# Patient Record
Sex: MALE | Race: WHITE | NOT HISPANIC OR LATINO | Employment: FULL TIME | URBAN - METROPOLITAN AREA
[De-identification: names, ages, dates, MRNs, and addresses within clinical notes are randomized per-mention and may not be internally consistent; named-entity substitution may affect disease eponyms.]

---

## 2017-05-16 ENCOUNTER — ALLSCRIPTS OFFICE VISIT (OUTPATIENT)
Dept: OTHER | Facility: OTHER | Age: 37
End: 2017-05-16

## 2017-08-16 DIAGNOSIS — R00.2 PALPITATIONS: ICD-10-CM

## 2017-08-16 DIAGNOSIS — T50.905A ADVERSE EFFECT OF DRUG OR MEDICAMENT: ICD-10-CM

## 2017-08-16 DIAGNOSIS — I10 ESSENTIAL (PRIMARY) HYPERTENSION: ICD-10-CM

## 2017-08-16 DIAGNOSIS — I49.3 VENTRICULAR PREMATURE DEPOLARIZATION: ICD-10-CM

## 2018-01-14 VITALS
SYSTOLIC BLOOD PRESSURE: 110 MMHG | DIASTOLIC BLOOD PRESSURE: 80 MMHG | WEIGHT: 207 LBS | BODY MASS INDEX: 30.66 KG/M2 | HEIGHT: 69 IN | HEART RATE: 83 BPM

## 2018-05-18 RX ORDER — ATOMOXETINE 40 MG/1
1 CAPSULE ORAL DAILY
COMMUNITY

## 2018-05-18 RX ORDER — DIVALPROEX SODIUM 500 MG/1
750 TABLET, DELAYED RELEASE ORAL
COMMUNITY

## 2018-05-18 RX ORDER — METOPROLOL SUCCINATE 50 MG/1
1 TABLET, EXTENDED RELEASE ORAL
COMMUNITY
Start: 2017-10-10

## 2018-05-18 RX ORDER — LITHIUM CARBONATE 300 MG/1
1 TABLET, FILM COATED, EXTENDED RELEASE ORAL 2 TIMES DAILY
COMMUNITY

## 2018-05-18 RX ORDER — METHYLPREDNISOLONE 4 MG/1
TABLET ORAL
Refills: 0 | COMMUNITY
Start: 2018-03-28 | End: 2018-05-24 | Stop reason: CLARIF

## 2018-05-18 RX ORDER — BUSPIRONE HYDROCHLORIDE 15 MG/1
15 TABLET ORAL 2 TIMES DAILY
Refills: 5 | COMMUNITY
Start: 2018-03-22

## 2018-05-24 ENCOUNTER — OFFICE VISIT (OUTPATIENT)
Dept: CARDIOLOGY CLINIC | Facility: CLINIC | Age: 38
End: 2018-05-24
Payer: COMMERCIAL

## 2018-05-24 VITALS
DIASTOLIC BLOOD PRESSURE: 70 MMHG | WEIGHT: 209.3 LBS | BODY MASS INDEX: 31 KG/M2 | SYSTOLIC BLOOD PRESSURE: 98 MMHG | HEIGHT: 69 IN | HEART RATE: 96 BPM

## 2018-05-24 DIAGNOSIS — I10 BENIGN ESSENTIAL HYPERTENSION: Primary | ICD-10-CM

## 2018-05-24 DIAGNOSIS — R00.0 SINUS TACHYCARDIA: ICD-10-CM

## 2018-05-24 DIAGNOSIS — R00.2 PALPITATIONS: ICD-10-CM

## 2018-05-24 PROCEDURE — 99213 OFFICE O/P EST LOW 20 MIN: CPT | Performed by: INTERNAL MEDICINE

## 2018-05-24 NOTE — PROGRESS NOTES
Follow-up - Cardiology   Tila Foreman 42 y o  male MRN: [id number]        Problems    1  Benign essential hypertension  Basic metabolic panel   2  Palpitations     3  Sinus tachycardia         Impression:    Amandeep Reasons up with me at the [place] office for his prior history of sinus tachycardia, palpitations, benign essential hypertension  Blood pressure a little low today, but routinely well controlled  Palpitations only occur when he gets anxious  This did recently occur when he was on doxycycline, and was promptly stopped  No history of any significant arrhythmias discovered  Plan:      I recommend weekly blood pressure checks  If lightheadedness or low blood pressure persists, please cut losartan in half  HPI:   Tila Foreman is a 42y o  year old male   With a history of palpitations, hypertension, sinus tachycardia, mood disorder and prior history of adverse drug reaction to clarithromycin with a psychotic presentation who continues to follow with me for his history of palpitations and hypertension  His blood pressure is a little low today, he does feel slightly woozy, this is very unusual and not a typical symptom  His blood pressures routinely well controlled, he takes metoprolol succinate 50 mg daily and losartan 50 mg daily  Unfortunately he has not had blood work done, a BMP was ordered a year ago  He offers me no other new complaints  Review of Systems      Past Medical History:   Diagnosis Date    Anxiety     Bipolar disorder ([facility] [address] )     Depression     Hyperlipidemia     Hypertension     Psychiatric illness      History   Alcohol Use No     History   Drug Use No     History   Smoking Status    Never Smoker   Smokeless Tobacco    Never Used       Allergies:   Allergies   Allergen Reactions    Aloe     Ativan [Lorazepam]     Biaxin [Clarithromycin]     Ceclor [Cefaclor]     Clindamycin Nausea Only    Dexamethasone     Erythromycin Base     Escitalopram  Hepatitis B Virus Vaccine Hives    Iodine     Levaquin [Levofloxacin In D5w]     Levofloxacin     Lexapro [Escitalopram Oxalate]     Penicillins     Quinolones     Risperdal [Risperidone]     Sulfamethoxazole-Trimethoprim Other (See Comments)    Vancomycin     Xanax [Alprazolam]        Medications:     Current Outpatient Prescriptions:     atoMOXetine (STRATTERA) 40 mg capsule, Take 1 tablet by mouth daily, Disp: , Rfl:     busPIRone (BUSPAR) 15 mg tablet, 15 mg 3 (three) times a day, Disp: , Rfl: 5    divalproex sodium (DEPAKOTE) 500 mg EC tablet, Take 750 mg by mouth Daily  , Disp: , Rfl:     lithium carbonate (LITHOBID) 300 mg CR tablet, Take 1 tablet by mouth 2 (two) times a day, Disp: , Rfl:     losartan (COZAAR) 50 mg tablet, Take 50 mg by mouth daily  , Disp: , Rfl:     metoprolol succinate (TOPROL-XL) 50 mg 24 hr tablet, Take 1 tablet by mouth daily, Disp: , Rfl:     Multiple Vitamin (DAILY MULTIVITAMIN PO), Daily Multivitamin TABS  Refills: 0  Active, Disp: , Rfl:     Omega-3 Fatty Acids (FISH OIL PO), Take 3,000 g by mouth daily  , Disp: , Rfl:       Vitals:    05/24/18 1303   BP: 98/70   Pulse: 96     Weight (last 2 days)     Date/Time   Weight    05/24/18 1303  94 9 (209 3)            Physical Exam   Constitutional: He is oriented to person, place, and time  No distress  HENT:   Head: Normocephalic and atraumatic  Eyes: Conjunctivae are normal  No scleral icterus  Neck: Normal range of motion  No JVD present  Cardiovascular: Normal rate, regular rhythm, normal heart sounds and intact distal pulses  No murmur heard  Pulmonary/Chest: Effort normal and breath sounds normal  He has no wheezes  He has no rales  Musculoskeletal: He exhibits no edema  Neurological: He is alert and oriented to person, place, and time  Skin: Skin is warm and dry  He is not diaphoretic           Laboratory Studies:  Lab Results   Component Value Date     01/30/2016     01/25/2016  01/20/2016    K 3 5 01/30/2016    K 3 8 01/25/2016    K 3 8 01/20/2016     01/30/2016     01/25/2016     01/20/2016    CO2 31 01/30/2016    CO2 33 (H) 01/25/2016    CO2 32 01/20/2016    GLUCOSE 84 01/30/2016    GLUCOSE 91 01/25/2016    GLUCOSE 93 01/20/2016    CREATININE 0 91 01/30/2016    CREATININE 1 18 01/25/2016    CREATININE 1 19 01/20/2016    BUN 8 01/30/2016    BUN 13 01/25/2016    BUN 11 01/20/2016     Lab Results   Component Value Date    WBC 5 72 01/30/2016    RBC 4 79 01/30/2016    HGB 13 6 01/30/2016    HCT 41 4 01/30/2016    MCV 86 4 01/30/2016    MCH 28 4 01/30/2016    RDW 12 3 01/30/2016     01/30/2016     Lipid Profile:   Lab Results   Component Value Date    CHOL 147 01/28/2016    CHOL 155 01/20/2016     Lab Results   Component Value Date    HDL 31 (L) 01/28/2016    HDL 35 (L) 01/20/2016     Lab Results   Component Value Date    LDLCALC 91 01/28/2016    LDLCALC 98 01/20/2016     Lab Results   Component Value Date    TRIG 125 01/28/2016    TRIG 108 01/20/2016         Cardiac testing:         Trude Fothergill, MD    Portions of the record may have been created with voice recognition software   Occasional wrong word or "sound a like" substitutions may have occurred due to the inherent limitations of voice recognition software   Read the chart carefully and recognize, using context, where substitutions have occurred

## 2019-06-12 ENCOUNTER — OFFICE VISIT (OUTPATIENT)
Dept: CARDIOLOGY CLINIC | Facility: CLINIC | Age: 39
End: 2019-06-12
Payer: COMMERCIAL

## 2019-06-12 VITALS
BODY MASS INDEX: 29.49 KG/M2 | WEIGHT: 199.1 LBS | HEART RATE: 104 BPM | DIASTOLIC BLOOD PRESSURE: 82 MMHG | SYSTOLIC BLOOD PRESSURE: 122 MMHG | HEIGHT: 69 IN

## 2019-06-12 DIAGNOSIS — T50.905D ADVERSE EFFECT OF DRUG, SUBSEQUENT ENCOUNTER: ICD-10-CM

## 2019-06-12 DIAGNOSIS — R07.89 ATYPICAL CHEST PAIN: ICD-10-CM

## 2019-06-12 DIAGNOSIS — F39 MOOD DISORDER (HCC): Chronic | ICD-10-CM

## 2019-06-12 DIAGNOSIS — R00.0 SINUS TACHYCARDIA: ICD-10-CM

## 2019-06-12 DIAGNOSIS — I10 HYPERTENSION, UNSPECIFIED TYPE: Primary | ICD-10-CM

## 2019-06-12 DIAGNOSIS — R00.2 PALPITATIONS: ICD-10-CM

## 2019-06-12 DIAGNOSIS — I10 BENIGN ESSENTIAL HYPERTENSION: ICD-10-CM

## 2019-06-12 PROCEDURE — 93000 ELECTROCARDIOGRAM COMPLETE: CPT | Performed by: INTERNAL MEDICINE

## 2019-06-12 PROCEDURE — 99213 OFFICE O/P EST LOW 20 MIN: CPT | Performed by: INTERNAL MEDICINE

## 2019-06-12 RX ORDER — OMEPRAZOLE 20 MG/1
20 CAPSULE, DELAYED RELEASE ORAL
COMMUNITY
End: 2022-06-22

## 2022-06-09 ENCOUNTER — CONSULT (OUTPATIENT)
Dept: SURGERY | Facility: CLINIC | Age: 42
End: 2022-06-09
Payer: COMMERCIAL

## 2022-06-09 ENCOUNTER — PREP FOR PROCEDURE (OUTPATIENT)
Dept: SURGERY | Facility: CLINIC | Age: 42
End: 2022-06-09

## 2022-06-09 VITALS
WEIGHT: 204 LBS | HEIGHT: 69 IN | DIASTOLIC BLOOD PRESSURE: 89 MMHG | HEART RATE: 78 BPM | BODY MASS INDEX: 30.21 KG/M2 | SYSTOLIC BLOOD PRESSURE: 130 MMHG

## 2022-06-09 DIAGNOSIS — K42.9 UMBILICAL HERNIA: Primary | ICD-10-CM

## 2022-06-09 DIAGNOSIS — K42.9 UMBILICAL HERNIA WITHOUT OBSTRUCTION AND WITHOUT GANGRENE: Primary | ICD-10-CM

## 2022-06-09 PROCEDURE — 99244 OFF/OP CNSLTJ NEW/EST MOD 40: CPT | Performed by: SURGERY

## 2022-06-09 NOTE — H&P (VIEW-ONLY)
Assessment/Plan:    Diagnoses and all orders for this visit:    Umbilical hernia without obstruction and without gangrene    Discussed risks and benefits of an umbilical hernia repair including recurrence or bowel injury and agrees to proceed  States anaphylaxis to Ceclor  Clindamycin gives him nausea only  Subjective:      Patient ID: Gemini Mukherjee is a 35 y o  male  Patient presents for umbilical hernia consult  States he has had a bulge and achy pain at his umbilicus for 2 months  Limits his activities  He is feeling occasional discomfort as he is noticed the bulge progress  Denies nausea or vomiting  I reviewed his multiple allergies listed  He asked about anesthesia at the time of his umbilical hernia  He did have fever for several weeks after a knee arthroscopy  Had seen endocrinology and no specific etiology was able to be found  States he has seen an allergist regarding his multiple medication allergies  The following portions of the patient's history were reviewed and updated as appropriate:     He  has a past medical history of Anxiety, Bipolar disorder ([address] ), Depression, Hyperlipidemia, Hypertension, and Psychiatric illness  He  has a past surgical history that includes Knee arthroscopy w/ ACL reconstruction  His family history includes Bipolar disorder in his father and sister; Depression in his mother  He  reports that he has never smoked  He has never used smokeless tobacco  He reports that he does not drink alcohol and does not use drugs    Current Outpatient Medications   Medication Sig Dispense Refill    atoMOXetine (STRATTERA) 40 mg capsule Take 1 tablet by mouth daily      busPIRone (BUSPAR) 15 mg tablet 15 mg 3 (three) times a day  5    divalproex sodium (DEPAKOTE) 500 mg EC tablet Take 750 mg by mouth Daily        lithium carbonate (LITHOBID) 300 mg CR tablet Take 1 tablet by mouth 2 (two) times a day      losartan (COZAAR) 50 mg tablet Take 50 mg by mouth daily       metoprolol succinate (TOPROL-XL) 50 mg 24 hr tablet Take 1 tablet by mouth daily      Multiple Vitamin (DAILY MULTIVITAMIN PO) Daily Multivitamin TABS  Refills: 0  Active      Omega-3 Fatty Acids (FISH OIL PO) Take 3,000 g by mouth daily        omeprazole (PriLOSEC) 20 mg delayed release capsule Take 20 mg by mouth daily       No current facility-administered medications for this visit  He is allergic to aloe, ativan [lorazepam], biaxin [clarithromycin], ceclor [cefaclor], clindamycin, dexamethasone, erythromycin base, escitalopram, hepatitis b virus vaccines, iodine - food allergy, levaquin [levofloxacin in d5w], levofloxacin, lexapro [escitalopram oxalate], penicillins, quinolones, risperdal [risperidone], sulfamethoxazole-trimethoprim, vancomycin, and xanax [alprazolam]       Review of Systems   HENT: Positive for postnasal drip  Allergic/Immunologic: Positive for environmental allergies  Psychiatric/Behavioral: The patient is nervous/anxious  All other systems reviewed and are negative  Objective:      /89   Pulse 78   Ht 5' 9" (1 753 m)   Wt 92 5 kg (204 lb)   BMI 30 13 kg/m²          Physical Exam  Constitutional:       General: He is not in acute distress  Comments: Nervous/anxious during exam   HENT:      Head: Atraumatic  Mouth/Throat:      Mouth: Mucous membranes are moist    Cardiovascular:      Rate and Rhythm: Normal rate  Pulmonary:      Effort: Pulmonary effort is normal    Abdominal:      General: Abdomen is flat  Bowel sounds are normal       Palpations: Abdomen is soft  Hernia: A hernia (Small umbilical hernia noted at the supraumbilical fold ) is present  Musculoskeletal:      Cervical back: Normal range of motion  Skin:     General: Skin is warm and dry  Neurological:      Mental Status: He is alert         extremities:  No edema

## 2022-06-09 NOTE — PROGRESS NOTES
Assessment/Plan:    Diagnoses and all orders for this visit:    Umbilical hernia without obstruction and without gangrene    Discussed risks and benefits of an umbilical hernia repair including recurrence or bowel injury and agrees to proceed  States anaphylaxis to Ceclor  Clindamycin gives him nausea only  Subjective:      Patient ID: Luzmaria Isabel is a 35 y o  male  Patient presents for umbilical hernia consult  States he has had a bulge and achy pain at his umbilicus for 2 months  Limits his activities  He is feeling occasional discomfort as he is noticed the bulge progress  Denies nausea or vomiting  I reviewed his multiple allergies listed  He asked about anesthesia at the time of his umbilical hernia  He did have fever for several weeks after a knee arthroscopy  Had seen endocrinology and no specific etiology was able to be found  States he has seen an allergist regarding his multiple medication allergies  The following portions of the patient's history were reviewed and updated as appropriate:     He  has a past medical history of Anxiety, Bipolar disorder ([address] ), Depression, Hyperlipidemia, Hypertension, and Psychiatric illness  He  has a past surgical history that includes Knee arthroscopy w/ ACL reconstruction  His family history includes Bipolar disorder in his father and sister; Depression in his mother  He  reports that he has never smoked  He has never used smokeless tobacco  He reports that he does not drink alcohol and does not use drugs    Current Outpatient Medications   Medication Sig Dispense Refill    atoMOXetine (STRATTERA) 40 mg capsule Take 1 tablet by mouth daily      busPIRone (BUSPAR) 15 mg tablet 15 mg 3 (three) times a day  5    divalproex sodium (DEPAKOTE) 500 mg EC tablet Take 750 mg by mouth Daily        lithium carbonate (LITHOBID) 300 mg CR tablet Take 1 tablet by mouth 2 (two) times a day      losartan (COZAAR) 50 mg tablet Take 50 mg by mouth daily       metoprolol succinate (TOPROL-XL) 50 mg 24 hr tablet Take 1 tablet by mouth daily      Multiple Vitamin (DAILY MULTIVITAMIN PO) Daily Multivitamin TABS  Refills: 0  Active      Omega-3 Fatty Acids (FISH OIL PO) Take 3,000 g by mouth daily        omeprazole (PriLOSEC) 20 mg delayed release capsule Take 20 mg by mouth daily       No current facility-administered medications for this visit  He is allergic to aloe, ativan [lorazepam], biaxin [clarithromycin], ceclor [cefaclor], clindamycin, dexamethasone, erythromycin base, escitalopram, hepatitis b virus vaccines, iodine - food allergy, levaquin [levofloxacin in d5w], levofloxacin, lexapro [escitalopram oxalate], penicillins, quinolones, risperdal [risperidone], sulfamethoxazole-trimethoprim, vancomycin, and xanax [alprazolam]       Review of Systems   HENT: Positive for postnasal drip  Allergic/Immunologic: Positive for environmental allergies  Psychiatric/Behavioral: The patient is nervous/anxious  All other systems reviewed and are negative  Objective:      /89   Pulse 78   Ht 5' 9" (1 753 m)   Wt 92 5 kg (204 lb)   BMI 30 13 kg/m²          Physical Exam  Constitutional:       General: He is not in acute distress  Comments: Nervous/anxious during exam   HENT:      Head: Atraumatic  Mouth/Throat:      Mouth: Mucous membranes are moist    Cardiovascular:      Rate and Rhythm: Normal rate  Pulmonary:      Effort: Pulmonary effort is normal    Abdominal:      General: Abdomen is flat  Bowel sounds are normal       Palpations: Abdomen is soft  Hernia: A hernia (Small umbilical hernia noted at the supraumbilical fold ) is present  Musculoskeletal:      Cervical back: Normal range of motion  Skin:     General: Skin is warm and dry  Neurological:      Mental Status: He is alert         extremities:  No edema

## 2022-06-13 ENCOUNTER — ANESTHESIA EVENT (OUTPATIENT)
Dept: PERIOP | Facility: AMBULARY SURGERY CENTER | Age: 42
End: 2022-06-13
Payer: COMMERCIAL

## 2022-06-14 ENCOUNTER — CLINICAL SUPPORT (OUTPATIENT)
Dept: URGENT CARE | Facility: CLINIC | Age: 42
End: 2022-06-14
Payer: COMMERCIAL

## 2022-06-14 ENCOUNTER — APPOINTMENT (OUTPATIENT)
Dept: LAB | Facility: CLINIC | Age: 42
End: 2022-06-14
Payer: COMMERCIAL

## 2022-06-14 DIAGNOSIS — K42.9 UMBILICAL HERNIA: ICD-10-CM

## 2022-06-14 LAB
ANION GAP SERPL CALCULATED.3IONS-SCNC: 2 MMOL/L (ref 4–13)
BUN SERPL-MCNC: 11 MG/DL (ref 5–25)
CALCIUM SERPL-MCNC: 9.5 MG/DL (ref 8.3–10.1)
CHLORIDE SERPL-SCNC: 110 MMOL/L (ref 100–108)
CO2 SERPL-SCNC: 28 MMOL/L (ref 21–32)
CREAT SERPL-MCNC: 1.08 MG/DL (ref 0.6–1.3)
ERYTHROCYTE [DISTWIDTH] IN BLOOD BY AUTOMATED COUNT: 11.4 % (ref 11.6–15.1)
GFR SERPL CREATININE-BSD FRML MDRD: 83 ML/MIN/1.73SQ M
GLUCOSE P FAST SERPL-MCNC: 101 MG/DL (ref 65–99)
HCT VFR BLD AUTO: 48 % (ref 36.5–49.3)
HGB BLD-MCNC: 15.8 G/DL (ref 12–17)
MCH RBC QN AUTO: 30 PG (ref 26.8–34.3)
MCHC RBC AUTO-ENTMCNC: 32.9 G/DL (ref 31.4–37.4)
MCV RBC AUTO: 91 FL (ref 82–98)
PLATELET # BLD AUTO: 338 THOUSANDS/UL (ref 149–390)
PMV BLD AUTO: 9.5 FL (ref 8.9–12.7)
POTASSIUM SERPL-SCNC: 3.7 MMOL/L (ref 3.5–5.3)
RBC # BLD AUTO: 5.27 MILLION/UL (ref 3.88–5.62)
SODIUM SERPL-SCNC: 140 MMOL/L (ref 136–145)
WBC # BLD AUTO: 6.47 THOUSAND/UL (ref 4.31–10.16)

## 2022-06-14 PROCEDURE — 80048 BASIC METABOLIC PNL TOTAL CA: CPT

## 2022-06-14 PROCEDURE — 36415 COLL VENOUS BLD VENIPUNCTURE: CPT

## 2022-06-14 PROCEDURE — 85027 COMPLETE CBC AUTOMATED: CPT

## 2022-06-14 PROCEDURE — 93005 ELECTROCARDIOGRAM TRACING: CPT

## 2022-06-15 ENCOUNTER — TELEPHONE (OUTPATIENT)
Dept: SURGERY | Facility: CLINIC | Age: 42
End: 2022-06-15

## 2022-06-15 LAB
ATRIAL RATE: 71 BPM
P AXIS: 12 DEGREES
PR INTERVAL: 154 MS
QRS AXIS: 35 DEGREES
QRSD INTERVAL: 82 MS
QT INTERVAL: 368 MS
QTC INTERVAL: 399 MS
T WAVE AXIS: 58 DEGREES
VENTRICULAR RATE: 71 BPM

## 2022-06-15 PROCEDURE — 93010 ELECTROCARDIOGRAM REPORT: CPT | Performed by: INTERNAL MEDICINE

## 2022-06-15 NOTE — TELEPHONE ENCOUNTER
Called patient in response to his message re: abnormal lab results  Reassured him that labs are not significantly abnormal, all fine  [General Appearance - Well Nourished] : well nourished [General Appearance - Well Developed] : well developed [Normal Appearance] : normal appearance [Well Groomed] : well groomed [General Appearance - In No Acute Distress] : no acute distress [Bowel Sounds] : normal bowel sounds [Abdomen Soft] : soft [Abdomen Mass (___ Cm)] : no abdominal mass palpated [Urethral Meatus] : meatus normal [Penis Abnormality] : normal circumcised penis [Anus Abnormality] : the anus and perineum were normal [Skin Color & Pigmentation] : normal skin color and pigmentation [Skin Lesions] : no skin lesions [Heart Rate And Rhythm] : Heart rate and rhythm were normal [Edema] : no peripheral edema [] : no respiratory distress [Exaggerated Use Of Accessory Muscles For Inspiration] : no accessory muscle use [Chest Palpation] : palpation of the chest revealed no abnormalities [Oriented To Time, Place, And Person] : oriented to person, place, and time [Affect] : the affect was normal [Normal Station and Gait] : the gait and station were normal for the patient's age [Sensation] : the sensory exam was normal to light touch and pinprick [No Focal Deficits] : no focal deficits [Motor Exam] : the motor exam was normal [No Palpable Adenopathy] : no palpable adenopathy

## 2022-06-22 NOTE — PRE-PROCEDURE INSTRUCTIONS
Pre-Surgery Instructions:   Medication Instructions    atoMOXetine (STRATTERA) 40 mg capsule Hold day of surgery   busPIRone (BUSPAR) 15 mg tablet Take day of surgery   divalproex sodium (DEPAKOTE) 500 mg EC tablet Take night before surgery    lithium carbonate (LITHOBID) 300 mg CR tablet Take day of surgery   losartan (COZAAR) 50 mg tablet Take night before surgery    metoprolol succinate (TOPROL-XL) 50 mg 24 hr tablet Take night before surgery    Multiple Vitamin (DAILY MULTIVITAMIN PO) Stop taking 7 days prior to surgery   Omega-3 Fatty Acids (FISH OIL PO) Stop taking 7 days prior to surgery  Pre op and bathing instructions reviewed  Pt has hibiclens  Pt  Verbalized understanding of current visitor restrictions  Pt  Verbalized an understanding of all instructions reviewed and offers no concerns at this time  Instructed to avoid all ASA/NSAIDs and OTC Vit/Supp from now until after surgery per anesthesia guidelines   Tylenol ok prn  DOS meds with a few sips of H2O

## 2022-06-24 RX ORDER — DIPHENHYDRAMINE HCL 50 MG
50 CAPSULE ORAL EVERY 6 HOURS PRN
COMMUNITY

## 2022-06-27 ENCOUNTER — ANESTHESIA (OUTPATIENT)
Dept: PERIOP | Facility: AMBULARY SURGERY CENTER | Age: 42
End: 2022-06-27
Payer: COMMERCIAL

## 2022-06-27 ENCOUNTER — HOSPITAL ENCOUNTER (OUTPATIENT)
Facility: AMBULARY SURGERY CENTER | Age: 42
Setting detail: OUTPATIENT SURGERY
Discharge: HOME | End: 2022-06-27
Attending: SURGERY | Admitting: SURGERY
Payer: COMMERCIAL

## 2022-06-27 VITALS
OXYGEN SATURATION: 97 % | HEART RATE: 78 BPM | HEIGHT: 69 IN | WEIGHT: 201 LBS | BODY MASS INDEX: 29.77 KG/M2 | RESPIRATION RATE: 16 BRPM | SYSTOLIC BLOOD PRESSURE: 114 MMHG | TEMPERATURE: 97.8 F | DIASTOLIC BLOOD PRESSURE: 78 MMHG

## 2022-06-27 PROCEDURE — 49585 PR REPAIR UMBILICAL HERN,5+Y/O,REDUC: CPT | Performed by: SURGERY

## 2022-06-27 PROCEDURE — C1781 MESH (IMPLANTABLE): HCPCS | Performed by: SURGERY

## 2022-06-27 PROCEDURE — 49585 PR REPAIR UMBILICAL HERN,5+Y/O,REDUC: CPT | Performed by: PHYSICIAN ASSISTANT

## 2022-06-27 PROCEDURE — NC001 PR NO CHARGE: Performed by: PHYSICIAN ASSISTANT

## 2022-06-27 DEVICE — VENTRALEX ST HERNIA PATCH
Type: IMPLANTABLE DEVICE | Site: UMBILICAL | Status: FUNCTIONAL
Brand: VENTRALEX ST HERNIA PATCH

## 2022-06-27 RX ORDER — ONDANSETRON 2 MG/ML
INJECTION INTRAMUSCULAR; INTRAVENOUS AS NEEDED
Status: DISCONTINUED | OUTPATIENT
Start: 2022-06-27 | End: 2022-06-27

## 2022-06-27 RX ORDER — HYDROMORPHONE HCL/PF 1 MG/ML
0.5 SYRINGE (ML) INJECTION
Status: DISCONTINUED | OUTPATIENT
Start: 2022-06-27 | End: 2022-06-27 | Stop reason: HOSPADM

## 2022-06-27 RX ORDER — MAGNESIUM HYDROXIDE 1200 MG/15ML
LIQUID ORAL AS NEEDED
Status: DISCONTINUED | OUTPATIENT
Start: 2022-06-27 | End: 2022-06-27 | Stop reason: HOSPADM

## 2022-06-27 RX ORDER — SODIUM CHLORIDE, SODIUM LACTATE, POTASSIUM CHLORIDE, CALCIUM CHLORIDE 600; 310; 30; 20 MG/100ML; MG/100ML; MG/100ML; MG/100ML
125 INJECTION, SOLUTION INTRAVENOUS CONTINUOUS
Status: DISCONTINUED | OUTPATIENT
Start: 2022-06-27 | End: 2022-06-27 | Stop reason: HOSPADM

## 2022-06-27 RX ORDER — KETOROLAC TROMETHAMINE 30 MG/ML
INJECTION, SOLUTION INTRAMUSCULAR; INTRAVENOUS AS NEEDED
Status: DISCONTINUED | OUTPATIENT
Start: 2022-06-27 | End: 2022-06-27

## 2022-06-27 RX ORDER — FENTANYL CITRATE 50 UG/ML
INJECTION, SOLUTION INTRAMUSCULAR; INTRAVENOUS AS NEEDED
Status: DISCONTINUED | OUTPATIENT
Start: 2022-06-27 | End: 2022-06-27

## 2022-06-27 RX ORDER — CLINDAMYCIN PHOSPHATE 900 MG/50ML
900 INJECTION INTRAVENOUS EVERY 8 HOURS
Status: DISCONTINUED | OUTPATIENT
Start: 2022-06-27 | End: 2022-06-27 | Stop reason: HOSPADM

## 2022-06-27 RX ORDER — BUPIVACAINE HYDROCHLORIDE 2.5 MG/ML
INJECTION, SOLUTION EPIDURAL; INFILTRATION; INTRACAUDAL AS NEEDED
Status: DISCONTINUED | OUTPATIENT
Start: 2022-06-27 | End: 2022-06-27 | Stop reason: HOSPADM

## 2022-06-27 RX ORDER — MIDAZOLAM HYDROCHLORIDE 2 MG/2ML
INJECTION, SOLUTION INTRAMUSCULAR; INTRAVENOUS AS NEEDED
Status: DISCONTINUED | OUTPATIENT
Start: 2022-06-27 | End: 2022-06-27

## 2022-06-27 RX ORDER — PROPOFOL 10 MG/ML
INJECTION, EMULSION INTRAVENOUS AS NEEDED
Status: DISCONTINUED | OUTPATIENT
Start: 2022-06-27 | End: 2022-06-27

## 2022-06-27 RX ORDER — LIDOCAINE HYDROCHLORIDE 10 MG/ML
INJECTION, SOLUTION EPIDURAL; INFILTRATION; INTRACAUDAL; PERINEURAL AS NEEDED
Status: DISCONTINUED | OUTPATIENT
Start: 2022-06-27 | End: 2022-06-27

## 2022-06-27 RX ORDER — DEXMEDETOMIDINE HYDROCHLORIDE 100 UG/ML
INJECTION, SOLUTION INTRAVENOUS AS NEEDED
Status: DISCONTINUED | OUTPATIENT
Start: 2022-06-27 | End: 2022-06-27

## 2022-06-27 RX ORDER — LIDOCAINE HYDROCHLORIDE 10 MG/ML
0.5 INJECTION, SOLUTION EPIDURAL; INFILTRATION; INTRACAUDAL; PERINEURAL ONCE AS NEEDED
Status: DISCONTINUED | OUTPATIENT
Start: 2022-06-27 | End: 2022-06-27 | Stop reason: HOSPADM

## 2022-06-27 RX ORDER — FENTANYL CITRATE/PF 50 MCG/ML
50 SYRINGE (ML) INJECTION
Status: DISCONTINUED | OUTPATIENT
Start: 2022-06-27 | End: 2022-06-27 | Stop reason: HOSPADM

## 2022-06-27 RX ADMIN — PROPOFOL 50 MG: 10 INJECTION, EMULSION INTRAVENOUS at 12:48

## 2022-06-27 RX ADMIN — FENTANYL CITRATE 50 MCG: 50 INJECTION, SOLUTION INTRAMUSCULAR; INTRAVENOUS at 12:49

## 2022-06-27 RX ADMIN — PROPOFOL 30 MG: 10 INJECTION, EMULSION INTRAVENOUS at 13:07

## 2022-06-27 RX ADMIN — DEXMEDETOMIDINE HYDROCHLORIDE 4 MCG: 100 INJECTION, SOLUTION INTRAVENOUS at 12:58

## 2022-06-27 RX ADMIN — KETOROLAC TROMETHAMINE 30 MG: 30 INJECTION, SOLUTION INTRAMUSCULAR at 13:05

## 2022-06-27 RX ADMIN — DEXMEDETOMIDINE HYDROCHLORIDE 8 MCG: 100 INJECTION, SOLUTION INTRAVENOUS at 12:49

## 2022-06-27 RX ADMIN — LIDOCAINE HYDROCHLORIDE 50 MG: 10 INJECTION, SOLUTION EPIDURAL; INFILTRATION; INTRACAUDAL at 12:44

## 2022-06-27 RX ADMIN — ONDANSETRON 4 MG: 2 INJECTION INTRAMUSCULAR; INTRAVENOUS at 12:55

## 2022-06-27 RX ADMIN — MIDAZOLAM HYDROCHLORIDE 2 MG: 1 INJECTION, SOLUTION INTRAMUSCULAR; INTRAVENOUS at 12:41

## 2022-06-27 RX ADMIN — PROPOFOL 200 MG: 10 INJECTION, EMULSION INTRAVENOUS at 12:44

## 2022-06-27 RX ADMIN — FENTANYL CITRATE 50 MCG: 50 INJECTION, SOLUTION INTRAMUSCULAR; INTRAVENOUS at 12:54

## 2022-06-27 RX ADMIN — SODIUM CHLORIDE, SODIUM LACTATE, POTASSIUM CHLORIDE, AND CALCIUM CHLORIDE: .6; .31; .03; .02 INJECTION, SOLUTION INTRAVENOUS at 12:41

## 2022-06-27 RX ADMIN — CLINDAMYCIN PHOSPHATE 900 MG: 900 INJECTION, SOLUTION INTRAVENOUS at 12:43

## 2022-06-27 NOTE — OP NOTE
OPERATIVE REPORT  PATIENT NAME: Simona Freitas    :  1980  MRN: [id number]  Pt Location: [facility] 04    SURGERY DATE: 2022    Surgeon(s) and Role:     * Ashok Rosen DO - Primary     * Aysha Bain PA-C - Assisting  No qualified resident was available  Her assistance was required for exposure retraction throughout the case    Preop Diagnosis:  Umbilical hernia [S66 2]    Post-Op Diagnosis Codes:     * Umbilical hernia [B32 8]    Procedure(s) (LRB):  REPAIR HERNIA UMBILICAL (N/A) with 1 7 in Ventralex patch    Specimen(s):  * No specimens in log *    Estimated Blood Loss:   Minimal    Drains:  * No LDAs found *    Anesthesia Type:   General/local    Operative Indications:  Umbilical hernia [X69 1]      Operative Findings:  Umbilical hernia  Height 69 in weight 91 kg/201 lb  BMI 30  ASA 2  Wound class 1    Complications:   None    Procedure and Technique:  Patient was brought the operative suite and identified by visualization, conversation, by armband  Sequential compression pumps were placed  He was given clindamycin perioperatively  Once under anesthesia abdomen is then prepped and draped in a sterile fashion  Time-out was performed and was assured that the prep was dry  Local was instilled about the umbilicus  Curvilinear skin incision was made around the top of the umbilicus  Underlying subcutaneous tissue was divided hot cautery until the hernia sac was noted this was freed up from the surrounding tissues as well as the underlying fascial defect  Some preperitoneal fat was reduced  I swept the finger under the fascia and sure there was good room for the mesh  1 7 in Ventralex patch was chosen and placed under the fascial defect  0 Vicryl was used to anchor the tails 1 cm back from the superior inferior edges  Tails were trimmed  Irrigation is carried out  Fascia was closed on top of the mesh using 0 Vicryl in a figure-eight fashion x2    Since the same 0 Vicryl was used to tack the umbilical skin to the fascia  Irrigation is carried out  More local was instilled  Three 0 Vicryl was used to reapproximate subcutaneous tissues  Four Monocryl was used to close skin in a subcuticular fashion  Wounds washed and dried  Sterile skin glue was applied  He was awakened in the operating returned to the recovery area in stable condition having tolerated the procedure well     I was present for the entire procedure    Patient Disposition:  PACU       SIGNATURE: Loco Mcmanus DO  DATE: June 27, 2022  TIME: 1:04 PM

## 2022-06-27 NOTE — ANESTHESIA POSTPROCEDURE EVALUATION
Post-Op Assessment Note    CV Status:  Stable  Pain Score: 0    Pain management: adequate     Mental Status:  Arousable and sleepy   Hydration Status:  Euvolemic and stable   PONV Controlled:  Controlled   Airway Patency:  Patent and adequate      Post Op Vitals Reviewed: Yes      Staff: CRNA         No complications documented      BP  100/55    Temp     Pulse 71   Resp 16   SpO2 98%

## 2022-06-27 NOTE — DISCHARGE INSTRUCTIONS
Please call the office when you leave to schedule an appointment to be seen in 2-3 weeks    Activity:    Do not lift more than 25 lb for 4 weeks post-operatively                 Walking is encouraged  Normal daily activities including climbing steps are okay  Do not engage in strenuous activity or contact sports for 4-6 weeks post-operatively    Return to work:   Return to work to be discussed at first post-operative visit    Diet:   You may return to your normal heart healthy diet    Wound Care: Your wound is closed with skin glue  It is okay to shower  Wash incision gently with soap and water and pat dry  Do not soak incisions in bath water or swim for two weeks  Do not apply any creams or ointments  Apply ice as needed to    Pain Medication:   Please take as directed  No driving while taking narcotic pain medications    Other:  If you have questions after discharge please call the office      If you have increased pain, fever >101 5, increased drainage, redness or a bad smell at your surgery site, please call us immediately or come directly to the Emergency Room

## 2022-06-27 NOTE — INTERVAL H&P NOTE
H&P reviewed  After examining the patient I find no changes in the patients condition since the H&P had been written      Vitals:    06/27/22 1155   BP: 142/95   Pulse: 85   Resp: 18   Temp: (!) 97 °F (36 1 °C)   SpO2: 98%

## 2022-06-28 DIAGNOSIS — K42.9 UMBILICAL HERNIA WITHOUT OBSTRUCTION AND WITHOUT GANGRENE: Primary | ICD-10-CM

## 2022-06-28 RX ORDER — OXYCODONE HYDROCHLORIDE 5 MG/1
5 TABLET ORAL EVERY 4 HOURS PRN
Qty: 10 TABLET | Refills: 0 | Status: SHIPPED | OUTPATIENT
Start: 2022-06-28 | End: 2022-07-08

## 2022-07-13 ENCOUNTER — OFFICE VISIT (OUTPATIENT)
Dept: SURGERY | Facility: CLINIC | Age: 42
End: 2022-07-13

## 2022-07-13 DIAGNOSIS — K42.9 UMBILICAL HERNIA WITHOUT OBSTRUCTION AND WITHOUT GANGRENE: Primary | ICD-10-CM

## 2022-07-13 PROCEDURE — 99024 POSTOP FOLLOW-UP VISIT: CPT | Performed by: SURGERY

## 2022-07-13 NOTE — PROGRESS NOTES
Assessment/Plan:    Diagnoses and all orders for this visit:    Umbilical hernia without obstruction and without gangrene    Status post umbilical hernia repair with mesh  Doing quite well  Incision clean and dry  May resume unrestricted activity as of July 27th  Postoperative restrictions reviewed  All questions answered  ______________________________________________________  HPI: Patient presents post operatively  Umbilical hernia repair 1/86/1761  Has some incisional soreness as anticipated  ROS:  General ROS: negative for - chills, fatigue, fever or night sweats, weight loss  Respiratory ROS: no cough, shortness of breath, or wheezing  Cardiovascular ROS: no chest pain or dyspnea on exertion  Genito-Urinary ROS: no dysuria, trouble voiding, or hematuria  Musculoskeletal ROS: negative for - gait disturbance, joint pain or muscle pain  Neurological ROS: no TIA or stroke symptoms  GI ROS: see HPI  Skin ROS: no new rashes or lesions   Lymphatic ROS: no new adenopathy noted by pt     GYN ROS: see HPI, no new GYN history or bleeding noted  Psy ROS: no new mental or behavioral disturbances         Patient Active Problem List   Diagnosis    Depression    Atypical chest pain    Benign essential hypertension    Adverse reaction to drug    Palpitations    Sinus tachycardia    Hyperlipidemia    GERD (gastroesophageal reflux disease)    Bipolar disorder (HCC)    Anxiety       Allergies:  Ceclor [cefaclor], Aloe, Ativan [lorazepam], Clindamycin, Dexamethasone, Erythromycin base, Hepatitis b virus vaccines, Iodine - food allergy, Levaquin [levofloxacin in d5w], Levofloxacin, Lexapro [escitalopram oxalate], Penicillins, Quinolones, Risperdal [risperidone], Sulfamethoxazole-trimethoprim, Vancomycin, Xanax [alprazolam], Biaxin [clarithromycin], and Escitalopram      Current Outpatient Medications:     atoMOXetine (STRATTERA) 40 mg capsule, Take 1 tablet by mouth daily, Disp: , Rfl:    busPIRone (BUSPAR) 15 mg tablet, Take 15 mg by mouth 2 (two) times a day, Disp: , Rfl: 5    diphenhydrAMINE (BENADRYL) 50 mg capsule, Take 50 mg by mouth every 6 (six) hours as needed for itching, Disp: , Rfl:     divalproex sodium (DEPAKOTE) 500 mg EC tablet, Take 750 mg by mouth daily at bedtime, Disp: , Rfl:     lithium carbonate (LITHOBID) 300 mg CR tablet, Take 1 tablet by mouth 2 (two) times a day, Disp: , Rfl:     losartan (COZAAR) 50 mg tablet, Take 50 mg by mouth daily at bedtime, Disp: , Rfl:     metoprolol succinate (TOPROL-XL) 50 mg 24 hr tablet, Take 1 tablet by mouth daily at bedtime, Disp: , Rfl:     Multiple Vitamin (DAILY MULTIVITAMIN PO), Daily Multivitamin TABS  Refills: 0  Active, Disp: , Rfl:     Omega-3 Fatty Acids (FISH OIL PO), Take 3,000 g by mouth daily  , Disp: , Rfl:     Past Medical History:   Diagnosis Date    Anxiety     Bipolar disorder (HCC)     Depression     GERD (gastroesophageal reflux disease)     Hyperlipidemia     Hypertension     Psychiatric illness        Past Surgical History:   Procedure Laterality Date    KNEE ARTHROSCOPY W/ ACL RECONSTRUCTION Left     x2    DC REPAIR UMBILICAL DXVJ,7+N/B,CTAPU N/A 6/27/2022    Procedure: REPAIR HERNIA UMBILICAL;  Surgeon: Cathy Martin DO;  Location: [facility] MAIN OR;  Service: General    WISDOM TOOTH EXTRACTION         Family History   Problem Relation Age of Onset    Depression Mother     Bipolar disorder Father     Bipolar disorder Sister         reports that he has never smoked  He has never used smokeless tobacco  He reports current alcohol use  He reports that he does not use drugs  PHYSICAL EXAM    There were no vitals taken for this visit      General: normal, cooperative, no distress  Abdominal: soft, nondistended or nontender  Incision: clean, dry, and intact and healing well      Anastasiia Soto DO    Date: 7/13/2022 Time: 10:22 AM

## 2023-07-13 ENCOUNTER — OFFICE VISIT (OUTPATIENT)
Dept: SURGERY | Facility: CLINIC | Age: 43
End: 2023-07-13
Payer: COMMERCIAL

## 2023-07-13 VITALS
SYSTOLIC BLOOD PRESSURE: 149 MMHG | HEART RATE: 74 BPM | WEIGHT: 205 LBS | HEIGHT: 69 IN | DIASTOLIC BLOOD PRESSURE: 97 MMHG | BODY MASS INDEX: 30.36 KG/M2

## 2023-07-13 DIAGNOSIS — K40.90 RIGHT INGUINAL HERNIA: Primary | ICD-10-CM

## 2023-07-13 PROCEDURE — 99214 OFFICE O/P EST MOD 30 MIN: CPT | Performed by: SURGERY

## 2023-07-13 NOTE — PROGRESS NOTES
Assessment/Plan:    Diagnoses and all orders for this visit:    Right inguinal hernia    Risks and benefits of a right inguinal hernia repair were discussed with him including potential for spermatic cord injury, bowel injury, recurrence, or pain issues and he agrees to proceed. CBC, BMP, EKG preop    Cannot take ibuprofen as this contraindication given his use of lithium    Subjective:      Patient ID: Roderick Ayala is a 38 y.o. male. Patient presents for bilateral inguinal hernia consult. States he has had sharp, achy pain RLQ and LLQ for one week. Denies bulges. Limits his activities. History of umbilical hernia repair 8/89/6259. 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 Began to notice increased discomfort in the right groin area radiating into the testicle. Denied any lump or bulge. 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 Some of the testicular pain settled down but he still remained with groin pain as well as back pain. Does state some chronic back pain issues. States occasional umbilical hernia repair from a year ago but denies a lump or bulge. The following portions of the patient's history were reviewed and updated as appropriate:     He  has a past medical history of Anxiety, Bipolar disorder ([address]), Depression, GERD (gastroesophageal reflux disease), Hyperlipidemia, Hypertension, and Psychiatric illness. He  has a past surgical history that includes Knee arthroscopy w/ ACL reconstruction (Left); [place] tooth extraction; and pr rpr umbilical hrna 5 yrs/> reducible (N/A, 6/27/2022). His family history includes Bipolar disorder in his father and sister; Depression in his mother. He  reports that he has never smoked. He has never used smokeless tobacco. He reports current alcohol use. He reports that he does not use drugs.   Current Outpatient Medications   Medication Sig Dispense Refill   • atoMOXetine (STRATTERA) 40 mg capsule Take 1 tablet by mouth daily     • busPIRone (BUSPAR) 15 mg tablet Take 15 mg by mouth 2 (two) times a day  5   • diphenhydrAMINE (BENADRYL) 50 mg capsule Take 50 mg by mouth every 6 (six) hours as needed for itching     • divalproex sodium (DEPAKOTE) 500 mg EC tablet Take 750 mg by mouth daily at bedtime     • lithium carbonate (LITHOBID) 300 mg CR tablet Take 1 tablet by mouth 2 (two) times a day     • losartan (COZAAR) 50 mg tablet Take 50 mg by mouth daily at bedtime     • metoprolol succinate (TOPROL-XL) 50 mg 24 hr tablet Take 1 tablet by mouth daily at bedtime     • Multiple Vitamin (DAILY MULTIVITAMIN PO) Daily Multivitamin TABS  Refills: 0  Active     • Omega-3 Fatty Acids (FISH OIL PO) Take 3,000 g by mouth daily         No current facility-administered medications for this visit. He is allergic to ceclor [cefaclor], aloe, ativan [lorazepam], clindamycin, dexamethasone, erythromycin base, hepatitis b virus vaccines, iodine - food allergy, levaquin [levofloxacin in d5w], levofloxacin, lexapro [escitalopram oxalate], penicillins, quinolones, risperdal [risperidone], sulfamethoxazole-trimethoprim, vancomycin, xanax [alprazolam], biaxin [clarithromycin], and escitalopram..    Review of Systems   Gastrointestinal: Positive for abdominal pain. All other systems reviewed and are negative. Objective:      /97   Pulse 74   Ht 5' 9" (1.753 m)   Wt 93 kg (205 lb)   BMI 30.27 kg/m²          Physical Exam  Constitutional:       Comments: Nervous appearing   HENT:      Mouth/Throat:      Mouth: Mucous membranes are moist.   Eyes:      Extraocular Movements: Extraocular movements intact. Cardiovascular:      Rate and Rhythm: Normal rate. Pulmonary:      Effort: Pulmonary effort is normal.   Abdominal:      General: Abdomen is flat. Bowel sounds are normal.      Palpations: Abdomen is soft. Hernia: A hernia (Soft reducible right inguinal hernia. No signs of a left inguinal hernia.) is present. Comments: Healed umbilical scar without an umbilical hernia. Musculoskeletal:      Cervical back: Normal range of motion. Neurological:      Mental Status: He is alert.        Extremities: No edema

## 2023-07-13 NOTE — LETTER
July 13, 2023     [facility], [address]     Patient: Christiana To   YOB: 1980   Date of Visit: 7/13/2023       Dear Dr. Osullivan So: Thank you for referring Tevin Pompa to me for evaluation. Below are my notes for this consultation. If you have questions, please do not hesitate to call me. I look forward to following your patient along with you. Sincerely,        Mary Olson DO        CC: No Recipients    Mary hendrix DO  7/13/2023 10:03 AM  Sign when Signing Visit  Assessment/Plan:    Diagnoses and all orders for this visit:    Right inguinal hernia    Risks and benefits of a right inguinal hernia repair were discussed with him including potential for spermatic cord injury, bowel injury, recurrence, or pain issues and he agrees to proceed. Cannot take ibuprofen as this contraindication given his use of lithium    Subjective:     Patient ID: Christiana To is a 38 y.o. male. Patient presents for bilateral inguinal hernia consult. States he has had sharp, achy pain RLQ and LLQ for one week. Denies bulges. Limits his activities. History of umbilical hernia repair 3/13/6682. 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 Began to notice increased discomfort in the right groin area radiating into the testicle. Denied any lump or bulge. 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 Some of the testicular pain settled down but he still remained with groin pain as well as back pain. Does state some chronic back pain issues. States occasional umbilical hernia repair from a year ago but denies a lump or bulge.       The following portions of the patient's history were reviewed and updated as appropriate:     He  has a past medical history of Anxiety, Bipolar disorder ([address]), Depression, GERD (gastroesophageal reflux disease), Hyperlipidemia, Hypertension, and Psychiatric illness. He  has a past surgical history that includes Knee arthroscopy w/ ACL reconstruction (Left); [place] tooth extraction; and pr rpr umbilical hrna 5 yrs/> reducible (N/A, 6/27/2022). His family history includes Bipolar disorder in his father and sister; Depression in his mother. He  reports that he has never smoked. He has never used smokeless tobacco. He reports current alcohol use. He reports that he does not use drugs. Current Outpatient Medications   Medication Sig Dispense Refill   • atoMOXetine (STRATTERA) 40 mg capsule Take 1 tablet by mouth daily     • busPIRone (BUSPAR) 15 mg tablet Take 15 mg by mouth 2 (two) times a day  5   • diphenhydrAMINE (BENADRYL) 50 mg capsule Take 50 mg by mouth every 6 (six) hours as needed for itching     • divalproex sodium (DEPAKOTE) 500 mg EC tablet Take 750 mg by mouth daily at bedtime     • lithium carbonate (LITHOBID) 300 mg CR tablet Take 1 tablet by mouth 2 (two) times a day     • losartan (COZAAR) 50 mg tablet Take 50 mg by mouth daily at bedtime     • metoprolol succinate (TOPROL-XL) 50 mg 24 hr tablet Take 1 tablet by mouth daily at bedtime     • Multiple Vitamin (DAILY MULTIVITAMIN PO) Daily Multivitamin TABS  Refills: 0  Active     • Omega-3 Fatty Acids (FISH OIL PO) Take 3,000 g by mouth daily         No current facility-administered medications for this visit. He is allergic to ceclor [cefaclor], aloe, ativan [lorazepam], clindamycin, dexamethasone, erythromycin base, hepatitis b virus vaccines, iodine - food allergy, levaquin [levofloxacin in d5w], levofloxacin, lexapro [escitalopram oxalate], penicillins, quinolones, risperdal [risperidone], sulfamethoxazole-trimethoprim, vancomycin, xanax [alprazolam], biaxin [clarithromycin], and escitalopram..    Review of Systems   Gastrointestinal: Positive for abdominal pain. All other systems reviewed and are negative.        Objective:      /97   Pulse 74   Ht 5' 9" (1.753 m)   Wt 93 kg (205 lb) BMI 30.27 kg/m²         Physical Exam  Constitutional:       Comments: Nervous appearing   HENT:      Mouth/Throat:      Mouth: Mucous membranes are moist.   Eyes:      Extraocular Movements: Extraocular movements intact. Cardiovascular:      Rate and Rhythm: Normal rate. Pulmonary:      Effort: Pulmonary effort is normal.   Abdominal:      General: Abdomen is flat. Bowel sounds are normal.      Palpations: Abdomen is soft. Hernia: A hernia (Soft reducible right inguinal hernia. No signs of a left inguinal hernia.) is present. Comments: Healed umbilical scar without an umbilical hernia. Musculoskeletal:      Cervical back: Normal range of motion. Neurological:      Mental Status: He is alert.       Extremities: No edema

## 2023-07-19 ENCOUNTER — PREP FOR PROCEDURE (OUTPATIENT)
Dept: SURGERY | Facility: CLINIC | Age: 43
End: 2023-07-19

## 2023-07-19 DIAGNOSIS — K40.90 INGUINAL HERNIA: Primary | ICD-10-CM

## 2023-10-17 ENCOUNTER — OFFICE VISIT (OUTPATIENT)
Dept: URGENT CARE | Facility: CLINIC | Age: 43
End: 2023-10-17
Payer: COMMERCIAL

## 2023-10-17 VITALS
RESPIRATION RATE: 8 BRPM | HEART RATE: 88 BPM | WEIGHT: 210 LBS | SYSTOLIC BLOOD PRESSURE: 150 MMHG | BODY MASS INDEX: 31.1 KG/M2 | DIASTOLIC BLOOD PRESSURE: 71 MMHG | OXYGEN SATURATION: 99 % | HEIGHT: 69 IN | TEMPERATURE: 97.7 F

## 2023-10-17 DIAGNOSIS — J40 SINOBRONCHITIS: Primary | ICD-10-CM

## 2023-10-17 DIAGNOSIS — J32.9 SINOBRONCHITIS: Primary | ICD-10-CM

## 2023-10-17 PROCEDURE — 99213 OFFICE O/P EST LOW 20 MIN: CPT | Performed by: PHYSICIAN ASSISTANT

## 2023-10-17 RX ORDER — SULFAMETHOXAZOLE AND TRIMETHOPRIM 800; 160 MG/1; MG/1
1 TABLET ORAL EVERY 12 HOURS SCHEDULED
Qty: 14 TABLET | Refills: 0 | Status: SHIPPED | OUTPATIENT
Start: 2023-10-17 | End: 2023-10-24

## 2023-10-17 RX ORDER — ALBUTEROL SULFATE 90 UG/1
2 AEROSOL, METERED RESPIRATORY (INHALATION) EVERY 6 HOURS PRN
Qty: 18 G | Refills: 0 | Status: SHIPPED | OUTPATIENT
Start: 2023-10-17

## 2023-10-17 NOTE — LETTER
October 17, 2023     Patient: Huang Wick   YOB: 1980   Date of Visit: 10/17/2023       To Whom It May Concern:    Patient was seen in office today for acute medical concern.           Sincerely,        Bob Moser PA-C    CC: No Recipients

## 2023-10-17 NOTE — PATIENT INSTRUCTIONS
This could be viral or combination viral or bacterial.  Will cover with sulfa antibiotic. Take as instructed. May continue nasal rinses. May also add Flonase once a day. Refill of Proventil inhaler given. Follow up with PCP as needed.

## 2023-10-17 NOTE — PROGRESS NOTES
[facility]    NAME: Pia Karimi is a 38 y.o. male  : 1980    MRN: [id number]  DATE: 2023  TIME: 10:24 AM    Assessment and Plan   Sinobronchitis [J32.9, J40]  1. Sinobronchitis  sulfamethoxazole-trimethoprim (BACTRIM DS) 800-160 mg per tablet    albuterol (PROVENTIL HFA,VENTOLIN HFA) 90 mcg/act inhaler          Patient Instructions     Patient Instructions   This could be viral or combination viral or bacterial.  Will cover with sulfa antibiotic. Take as instructed. May continue nasal rinses. May also add Flonase once a day. Refill of Proventil inhaler given. Follow up with PCP as needed. Chief Complaint     Chief Complaint   Patient presents with    Cold Like Symptoms     Patient states that since last Monday he started with cough, chest congestion and nasal congestion       History of Present Illness   Pia Karimi presents to the clinic c/o  78-year-old male comes in with sinus pain and pressure, coughing, upper chest tightness off and on. 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 He is a pharmaceutical rep. Over the last 3 to 4 days he has felt a little crackly and burning in his chest.  He is felt a little lightheaded. No fever or chills at this point. Using Benadryl, resting, pushing fluids. Yesterday went out to the cool air to help his chest symptoms. History of exercise-induced asthma. No history of pneumonia. Multiple drug adverse side effects. Cannot tolerate sulfa that can cause some GI upset. Typically takes with probiotic. That seems to help. Review of Systems   Review of Systems   Constitutional:  Positive for activity change, appetite change and fatigue. Negative for chills, diaphoresis and fever. HENT:  Positive for congestion, postnasal drip, rhinorrhea, sinus pressure and sinus pain. Negative for sore throat. Eyes: Negative.     Respiratory: Positive for cough, chest tightness and wheezing. Negative for choking, shortness of breath and stridor. Cardiovascular:  Positive for chest pain. Negative for palpitations and leg swelling.         Some upper chest discomfort and burning especially with coughing       Current Medications     Long-Term Medications   Medication Sig Dispense Refill    atoMOXetine (STRATTERA) 40 mg capsule Take 1 tablet by mouth daily      lithium carbonate (LITHOBID) 300 mg CR tablet Take 1 tablet by mouth 2 (two) times a day      metoprolol succinate (TOPROL-XL) 50 mg 24 hr tablet Take 1 tablet by mouth daily at bedtime      diphenhydrAMINE (BENADRYL) 50 mg capsule Take 50 mg by mouth every 6 (six) hours as needed for itching (Patient not taking: Reported on 10/17/2023)         Current Allergies     Allergies as of 10/17/2023 - Reviewed 10/17/2023   Allergen Reaction Noted    Ceclor [cefaclor] Anaphylaxis 01/18/2016    Aloe Hives 01/18/2016    Ativan [lorazepam] Hyperactivity 01/18/2016    Clindamycin Nausea Only     Dexamethasone Other (See Comments)     Erythromycin base Hives 07/15/2008    Hepatitis b virus vaccines Hives 11/02/2015    Iodine - food allergy Delirium 07/15/2008    Levaquin [levofloxacin in d5w] Other (See Comments) 01/18/2016    Levofloxacin  11/11/2015    Lexapro [escitalopram oxalate]  01/18/2016    Penicillins Hives 11/11/2015    Quinolones Other (See Comments)     Risperdal [risperidone]  11/11/2015    Sulfamethoxazole-trimethoprim Other (See Comments) 07/15/2008    Vancomycin Hives 11/11/2015    Xanax [alprazolam] Hyperactivity 11/11/2015    Biaxin [clarithromycin] Anxiety 01/18/2016    Escitalopram Anxiety 01/19/2016          The following portions of the patient's history were reviewed and updated as appropriate: allergies, current medications, past family history, past medical history, past social history, past surgical history and problem list.  Past Medical History:   Diagnosis Date    Anxiety Bipolar disorder ([address])     Depression     GERD (gastroesophageal reflux disease)     Hyperlipidemia     Hypertension     Psychiatric illness      Past Surgical History:   Procedure Laterality Date    KNEE ARTHROSCOPY W/ ACL RECONSTRUCTION Left     x2    KS RPR UMBILICAL HRNA 5 YRS/> REDUCIBLE N/A 6/27/2022    Procedure: REPAIR HERNIA UMBILICAL;  Surgeon: Magdalena Jarquin DO;  Location: [facility] MAIN [facility];  Service: General    WISDOM TOOTH EXTRACTION       Family History   Problem Relation Age of Onset    Depression Mother     Bipolar disorder Father     Bipolar disorder Sister        Objective   /71   Pulse 88   Temp 97.7 °F (36.5 °C) (Tympanic)   Resp (!) 8   Ht 5' 9" (1.753 m)   Wt 95.3 kg (210 lb)   SpO2 99%   BMI 31.01 kg/m²   No LMP for male patient. Physical Exam     Physical Exam  Vitals and nursing note reviewed. Constitutional:       General: He is not in acute distress. Appearance: He is well-developed. He is ill-appearing. He is not toxic-appearing or diaphoretic. Comments: No trismus or conversational dyspnea. Appears mildly ill but in no acute distress. HENT:      Head: Normocephalic and atraumatic. Right Ear: Tympanic membrane, ear canal and external ear normal.      Left Ear: Tympanic membrane, ear canal and external ear normal.      Nose: Congestion present. No rhinorrhea. Mouth/Throat:      Mouth: Mucous membranes are moist.      Pharynx: Posterior oropharyngeal erythema present. No oropharyngeal exudate. Comments: Cobblestoning posterior pharynx with patchy redness. Eyes:      General: No scleral icterus. Right eye: No discharge. Left eye: No discharge. Conjunctiva/sclera: Conjunctivae normal.      Pupils: Pupils are equal, round, and reactive to light. Neck:      Trachea: No tracheal deviation. Cardiovascular:      Rate and Rhythm: Normal rate and regular rhythm. Heart sounds: Normal heart sounds. No murmur heard.      No friction rub. No gallop. Pulmonary:      Effort: Pulmonary effort is normal. No respiratory distress. Breath sounds: Normal breath sounds. No stridor. No wheezing, rhonchi or rales. Musculoskeletal:      Cervical back: Normal range of motion and neck supple. No rigidity or tenderness. Lymphadenopathy:      Cervical: No cervical adenopathy. Skin:     General: Skin is warm and dry. Findings: No rash. Comments: No acute rashes   Neurological:      Mental Status: He is alert and oriented to person, place, and time.    Psychiatric:         Mood and Affect: Mood normal.         Behavior: Behavior normal.

## 2024-05-29 ENCOUNTER — PREP FOR PROCEDURE (OUTPATIENT)
Dept: SURGERY | Facility: CLINIC | Age: 44
End: 2024-05-29

## 2024-05-29 ENCOUNTER — OFFICE VISIT (OUTPATIENT)
Dept: SURGERY | Facility: CLINIC | Age: 44
End: 2024-05-29
Payer: COMMERCIAL

## 2024-05-29 VITALS
WEIGHT: 205 LBS | HEIGHT: 69 IN | SYSTOLIC BLOOD PRESSURE: 148 MMHG | HEART RATE: 75 BPM | BODY MASS INDEX: 30.36 KG/M2 | DIASTOLIC BLOOD PRESSURE: 96 MMHG

## 2024-05-29 DIAGNOSIS — K40.90 INGUINAL HERNIA: Primary | ICD-10-CM

## 2024-05-29 DIAGNOSIS — K40.90 RIGHT INGUINAL HERNIA: Primary | ICD-10-CM

## 2024-05-29 PROCEDURE — 99214 OFFICE O/P EST MOD 30 MIN: CPT | Performed by: SURGERY

## 2024-05-29 NOTE — PROGRESS NOTES
Assessment/Plan:    Diagnoses and all orders for this visit:    Right inguinal hernia    Discussed risks and benefits of a right inguinal hernia repair including the potential for spermatic cord injury, recurrence, or pain issues and he agrees to proceed.  Will order CBC, BMP, EKG preoperatively.  He tolerated clindamycin preoperatively before his umbilical hernia repair in June 2022 and will likely use that.    He avoids ibuprofen as this can affect his lithium levels.    Subjective:      Patient ID: Austin Jones is a 43 y.o. male.    Patient presents for follow up on right inguinal hernia to discuss repair.    Seen a year ago with a right inguinal hernia.  Arrangements were initially made but then he decided to postpone.  He now presents to have this reevaluated.  He does feel it is a little bit bigger.  Denies any left groin pain.        The following portions of the patient's history were reviewed and updated as appropriate:     He  has a past medical history of Anxiety, Bipolar disorder ([facility]), Depression, GERD (gastroesophageal reflux disease), Hyperlipidemia, Hypertension, and Psychiatric illness.  He  has a past surgical history that includes Knee arthroscopy w/ ACL reconstruction (Left); [place] tooth extraction; pr rpr umbilical hrna 5 yrs/> reducible (N/A, 06/27/2022); and Hernia repair.  His family history includes Bipolar disorder in his father and sister; Depression in his mother.  He  reports that he has never smoked. He has never used smokeless tobacco. He reports current alcohol use. He reports that he does not use drugs.  Current Outpatient Medications   Medication Sig Dispense Refill    albuterol (PROVENTIL HFA,VENTOLIN HFA) 90 mcg/act inhaler Inhale 2 puffs every 6 (six) hours as needed for wheezing or shortness of breath 18 g 0    atoMOXetine (STRATTERA) 40 mg capsule Take 1 tablet by mouth daily      busPIRone (BUSPAR) 15 mg tablet Take 15 mg by mouth 2 (two) times a day  5    diphenhydrAMINE  "(BENADRYL) 50 mg capsule Take 50 mg by mouth every 6 (six) hours as needed for itching (Patient not taking: Reported on 10/17/2023)      divalproex sodium (DEPAKOTE) 500 mg EC tablet Take 750 mg by mouth daily at bedtime      lithium carbonate (LITHOBID) 300 mg CR tablet Take 1 tablet by mouth 2 (two) times a day      losartan (COZAAR) 50 mg tablet Take 50 mg by mouth daily at bedtime      metoprolol succinate (TOPROL-XL) 50 mg 24 hr tablet Take 1 tablet by mouth daily at bedtime      Multiple Vitamin (DAILY MULTIVITAMIN PO) Daily Multivitamin TABS  Refills: 0  Active      Omega-3 Fatty Acids (FISH OIL PO) Take 3,000 g by mouth daily         No current facility-administered medications for this visit.     He is allergic to ceclor [cefaclor], aloe, ativan [lorazepam], clindamycin, dexamethasone, erythromycin base, hepatitis b virus vaccines, iodine - food allergy, levaquin [levofloxacin in d5w], levofloxacin, lexapro [escitalopram oxalate], penicillins, quinolones, risperdal [risperidone], sulfamethoxazole-trimethoprim, vancomycin, xanax [alprazolam], biaxin [clarithromycin], and escitalopram..    Review of Systems   Gastrointestinal:  Positive for abdominal pain.   All other systems reviewed and are negative.        Objective:      /96   Pulse 75   Ht 5' 9\" (1.753 m)   Wt 93 kg (205 lb)   BMI 30.27 kg/m²          Physical Exam  Constitutional:       General: He is not in acute distress.  HENT:      Head: Atraumatic.      Nose: Nose normal.      Mouth/Throat:      Mouth: Mucous membranes are moist.   Eyes:      Extraocular Movements: Extraocular movements intact.   Cardiovascular:      Rate and Rhythm: Normal rate.   Pulmonary:      Effort: Pulmonary effort is normal.   Abdominal:      General: Abdomen is flat.      Palpations: Abdomen is soft.      Hernia: A hernia (Soft reducible right inguinal hernia.  No left inguinal hernia.  Umbilical hernia repair is intact without signs of recurrence) is present. "   Musculoskeletal:      Cervical back: Normal range of motion.   Skin:     General: Skin is warm and dry.   Neurological:      Mental Status: He is alert.      Extremities: No edema

## 2024-05-29 NOTE — LETTER
May 29, 2024     Alphonse Irving DO  [address] [facility]  [address]  [facility] [address]    Patient: Austin Jones   YOB: 1980   Date of Visit: 5/29/2024       Dear Dr. Irving:    Thank you for referring Austin Jones to me for evaluation. Below are my notes for this consultation.    If you have questions, please do not hesitate to call me. I look forward to following your patient along with you.         Sincerely,        Harshal Olson DO        CC: No Recipients    Harshal Olson DO  5/29/2024  1:38 PM  Sign when Signing Visit  Assessment/Plan:    Diagnoses and all orders for this visit:    Right inguinal hernia    Discussed risks and benefits of a right inguinal hernia repair including the potential for spermatic cord injury, recurrence, or pain issues and he agrees to proceed.  Will order CBC, BMP, EKG preoperatively.  He tolerated clindamycin preoperatively before his umbilical hernia repair in June 2022 and will likely use that.    He avoids ibuprofen as this can affect his lithium levels.    Subjective:      Patient ID: Austin Jones is a 43 y.o. male.    Patient presents for follow up on right inguinal hernia to discuss repair.    Seen a year ago with a right inguinal hernia.  Arrangements were initially made but then he decided to postpone.  He now presents to have this reevaluated.  He does feel it is a little bit bigger.  Denies any left groin pain.        The following portions of the patient's history were reviewed and updated as appropriate:     He  has a past medical history of Anxiety, Bipolar disorder (HCC), Depression, GERD (gastroesophageal reflux disease), Hyperlipidemia, Hypertension, and Psychiatric illness.  He  has a past surgical history that includes Knee arthroscopy w/ ACL reconstruction (Left); [place] tooth extraction; pr rpr umbilical hrna 5 yrs/> reducible (N/A, 06/27/2022); and Hernia repair.  His family history includes Bipolar disorder in his father and  "sister; Depression in his mother.  He  reports that he has never smoked. He has never used smokeless tobacco. He reports current alcohol use. He reports that he does not use drugs.  Current Outpatient Medications   Medication Sig Dispense Refill   • albuterol (PROVENTIL HFA,VENTOLIN HFA) 90 mcg/act inhaler Inhale 2 puffs every 6 (six) hours as needed for wheezing or shortness of breath 18 g 0   • atoMOXetine (STRATTERA) 40 mg capsule Take 1 tablet by mouth daily     • busPIRone (BUSPAR) 15 mg tablet Take 15 mg by mouth 2 (two) times a day  5   • diphenhydrAMINE (BENADRYL) 50 mg capsule Take 50 mg by mouth every 6 (six) hours as needed for itching (Patient not taking: Reported on 10/17/2023)     • divalproex sodium (DEPAKOTE) 500 mg EC tablet Take 750 mg by mouth daily at bedtime     • lithium carbonate (LITHOBID) 300 mg CR tablet Take 1 tablet by mouth 2 (two) times a day     • losartan (COZAAR) 50 mg tablet Take 50 mg by mouth daily at bedtime     • metoprolol succinate (TOPROL-XL) 50 mg 24 hr tablet Take 1 tablet by mouth daily at bedtime     • Multiple Vitamin (DAILY MULTIVITAMIN PO) Daily Multivitamin TABS  Refills: 0  Active     • Omega-3 Fatty Acids (FISH OIL PO) Take 3,000 g by mouth daily         No current facility-administered medications for this visit.     He is allergic to ceclor [cefaclor], aloe, ativan [lorazepam], clindamycin, dexamethasone, erythromycin base, hepatitis b virus vaccines, iodine - food allergy, levaquin [levofloxacin in d5w], levofloxacin, lexapro [escitalopram oxalate], penicillins, quinolones, risperdal [risperidone], sulfamethoxazole-trimethoprim, vancomycin, xanax [alprazolam], biaxin [clarithromycin], and escitalopram..    Review of Systems   Gastrointestinal:  Positive for abdominal pain.   All other systems reviewed and are negative.        Objective:      /96   Pulse 75   Ht 5' 9\" (1.753 m)   Wt 93 kg (205 lb)   BMI 30.27 kg/m²          Physical Exam  Constitutional: "       General: He is not in acute distress.  HENT:      Head: Atraumatic.      Nose: Nose normal.      Mouth/Throat:      Mouth: Mucous membranes are moist.   Eyes:      Extraocular Movements: Extraocular movements intact.   Cardiovascular:      Rate and Rhythm: Normal rate.   Pulmonary:      Effort: Pulmonary effort is normal.   Abdominal:      General: Abdomen is flat.      Palpations: Abdomen is soft.      Hernia: A hernia (Soft reducible right inguinal hernia.  No left inguinal hernia.  Umbilical hernia repair is intact without signs of recurrence) is present.   Musculoskeletal:      Cervical back: Normal range of motion.   Skin:     General: Skin is warm and dry.   Neurological:      Mental Status: He is alert.      Extremities: No edema

## 2024-06-02 ENCOUNTER — ANESTHESIA EVENT (OUTPATIENT)
Dept: ANESTHESIOLOGY | Facility: HOSPITAL | Age: 44
End: 2024-06-02

## 2024-06-02 ENCOUNTER — ANESTHESIA (OUTPATIENT)
Dept: ANESTHESIOLOGY | Facility: HOSPITAL | Age: 44
End: 2024-06-02

## 2025-07-23 ENCOUNTER — TELEPHONE (OUTPATIENT)
Age: 45
End: 2025-07-23

## 2025-07-23 NOTE — TELEPHONE ENCOUNTER
Patient has been added to the Medication Management wait list without a referral.    Insurance: bc  Insurance Type:    Commercial [x]   Medicaid []   County (if applicable)   Medicare []  Location Preference: no pref   Provider Preference: male   Virtual: Yes [x] No [] First visit in person   Were outside resources sent: Yes [x] No []  [email or web address]    Have not has psych in last 10 year. Pcp has been maintaining med but last 6mo been feeling agitated and upset. Drinking more and having issues with wife. Feeling manic maybe feeling like he is bipolar like his father.

## (undated) DEVICE — NEEDLE 22 G X 1 1/2 SAFETY

## (undated) DEVICE — INTENDED FOR TISSUE SEPARATION, AND OTHER PROCEDURES THAT REQUIRE A SHARP SURGICAL BLADE TO PUNCTURE OR CUT.: Brand: BARD-PARKER SAFETY BLADES SIZE 15, STERILE

## (undated) DEVICE — DRAPE EQUIPMENT RF WAND

## (undated) DEVICE — VIAL DECANTER

## (undated) DEVICE — GLOVE SRG BIOGEL ORTHOPEDIC 8

## (undated) DEVICE — CHLORAPREP HI-LITE 26ML ORANGE

## (undated) DEVICE — PLUMEPEN PRO 10FT

## (undated) DEVICE — TOWEL SURG XR DETECT GREEN STRL RFD

## (undated) DEVICE — PAD GROUNDING ADULT

## (undated) DEVICE — SUT VICRYL 0 UR-6 27 IN J603H

## (undated) DEVICE — BETHLEHEM UNIVERSAL MINOR GEN: Brand: CARDINAL HEALTH

## (undated) DEVICE — SUT MONOCRYL 4-0 PS-2 27 IN Y426H

## (undated) DEVICE — ADHESIVE SKIN HIGH VISCOSITY EXOFIN 1ML